# Patient Record
Sex: FEMALE
[De-identification: names, ages, dates, MRNs, and addresses within clinical notes are randomized per-mention and may not be internally consistent; named-entity substitution may affect disease eponyms.]

---

## 2021-02-02 PROBLEM — Z00.00 ENCOUNTER FOR PREVENTIVE HEALTH EXAMINATION: Status: ACTIVE | Noted: 2021-02-02

## 2021-02-09 ENCOUNTER — APPOINTMENT (OUTPATIENT)
Dept: ENDOCRINOLOGY | Facility: CLINIC | Age: 66
End: 2021-02-09
Payer: MEDICARE

## 2021-02-09 VITALS
SYSTOLIC BLOOD PRESSURE: 128 MMHG | HEART RATE: 62 BPM | OXYGEN SATURATION: 98 % | WEIGHT: 262 LBS | HEIGHT: 68 IN | DIASTOLIC BLOOD PRESSURE: 73 MMHG | RESPIRATION RATE: 15 BRPM | BODY MASS INDEX: 39.71 KG/M2

## 2021-02-09 DIAGNOSIS — K21.9 GASTRO-ESOPHAGEAL REFLUX DISEASE W/OUT ESOPHAGITIS: ICD-10-CM

## 2021-02-09 DIAGNOSIS — Z83.3 FAMILY HISTORY OF DIABETES MELLITUS: ICD-10-CM

## 2021-02-09 DIAGNOSIS — J45.909 UNSPECIFIED ASTHMA, UNCOMPLICATED: ICD-10-CM

## 2021-02-09 DIAGNOSIS — Z87.891 PERSONAL HISTORY OF NICOTINE DEPENDENCE: ICD-10-CM

## 2021-02-09 LAB
GLUCOSE BLDC GLUCOMTR-MCNC: 187
HBA1C MFR BLD HPLC: 9.2

## 2021-02-09 PROCEDURE — 82962 GLUCOSE BLOOD TEST: CPT

## 2021-02-09 PROCEDURE — 99072 ADDL SUPL MATRL&STAF TM PHE: CPT

## 2021-02-09 PROCEDURE — 99205 OFFICE O/P NEW HI 60 MIN: CPT | Mod: 25

## 2021-02-09 PROCEDURE — 83036 HEMOGLOBIN GLYCOSYLATED A1C: CPT | Mod: QW

## 2021-02-09 RX ORDER — BUDESONIDE AND FORMOTEROL FUMARATE DIHYDRATE 160; 4.5 UG/1; UG/1
160-4.5 AEROSOL RESPIRATORY (INHALATION)
Refills: 0 | Status: ACTIVE | COMMUNITY

## 2021-02-09 RX ORDER — DULAGLUTIDE 0.75 MG/.5ML
0.75 INJECTION, SOLUTION SUBCUTANEOUS
Qty: 1 | Refills: 0 | Status: COMPLETED | OUTPATIENT
Start: 2021-02-09 | End: 2021-03-11

## 2021-02-09 RX ORDER — MULTIVITAMIN
TABLET ORAL
Refills: 0 | Status: ACTIVE | COMMUNITY

## 2021-02-09 RX ORDER — CALCIUM CITRATE/VITAMIN D3 315MG-6.25
TABLET ORAL
Refills: 0 | Status: ACTIVE | COMMUNITY

## 2021-02-23 NOTE — DATA REVIEWED
[FreeTextEntry1] : Laboratories (December 1, 2020) reviewed and significant for: \par Platelet count 418 K/mcL (normal: 140-400), otherwise unremarkable complete blood count\par Unremarkable comprehensive metabolic panel\par  mg/dL\par HDL 41 mg/dL\par Total cholesterol 175 mg/dL\par Triglycerides 128 gm/dL

## 2021-02-23 NOTE — ASSESSMENT
[FreeTextEntry1] : Type 2 diabetes mellitus/elevated body mass index. Point-of-care HbA1c 9.2% and blood glucose 187 mg/dL today. No known history of micro- or macrovascular complications. We discussed the cardiovascular and microvascular complications of uncontrolled diabetes. We discussed the importance of diet and exercise and lifestyle modification for glycemic control and weight loss. We discussed pharmacologic options for glycemic control and weight loss. She is amenable to a trial of a GLP-1 receptor agonist. We discussed the risks and benefits of the GLP-1 receptor agonist class, including but not limited to nausea, pancreatitis, medullary thyroid cancer. We reviewed subcutaneous injection technique, storage, and sharps disposal. She successfully administered a dose of Trulicity in the office. We will plan to discontinue Farxiga once glycemic control improved due to her history of vulvovaginitis.\par Continue metformin  mg daily\par Continue Farxiga 5 mg daily\par Start Trulicity 0.75 mg weekly for four weeks (samples given), then 1.5 mg weekly if covered by insurance\par Check blood sugars twice daily, fasting and postprandial; glycemic goals reviewed\par She is not on a blood pressure regimen; blood pressure around goal\par She is on a statin for cholesterol; last lipid panel not at goal and recommended adjustment in atorvastatin to 40 mg daily\par Nephropathy screening: Due; address next visit\par Last ophthalmology appointment: October 2020; upcoming appointment in April 2021\par Last dental appointment: Full dentures\par She is up-to-date with influenza and pneumonia vaccines\par \par Return to see me in 3 months. Patient advised to call earlier with significant hypo- or hyperglycemia. \par \par CC:\par Dr. Marco A Lazaro, Fax [phone]

## 2021-02-23 NOTE — HISTORY OF PRESENT ILLNESS
[FreeTextEntry1] : Ms. Rubio is a 66 year-old woman with a history of type 2 diabetes mellitus presenting to [facility] care with me.\par \par Type 2 diabetes mellitus. Point-of-care HbA1c 9.2% and blood glucose 187 mg/dL today. No known history of micro- or macrovascular complications.\par She was diagnosed with diabetes at age 50. No hospitalizations for hypo- or hyperglycemia.\par She is currently taking metformin  mg daily, Farxiga 5 mg daily. She did not tolerate higher doses of metformin in the past. She was previously on glipizide with blood sugar variability. She has a history of Candidal vulvovaginitis on Farxiga, under control with fluconazole once weekly. \par She is checking blood sugars daily. Fasting blood sugars 90-150s mg/dL, mostly 120-130s mg/dL. No recent hypoglycemia.\par She is not on a blood pressure regimen\par She is on a statin for cholesterol\par Nephropathy screening: Due\par Last ophthalmology appointment: October 2020; upcoming appointment in April 2021\par Last dental appointment: Full dentures\par She is up-to-date with influenza and pneumonia vaccines\par 24 hour diet recall: breakfast, grilled cheese sandwich, tea with Splenda; lunch, none; dinner, chicken sandwich, salad; no juices/sodas/sweetened beverages\par Exercise: Limited\par \par She has had fatigue, polyuria/polydipsia, headaches. No chest pain, shortness of breath, polyuria/polydipsia, lower extremity numbness/tingling.

## 2021-05-11 ENCOUNTER — APPOINTMENT (OUTPATIENT)
Dept: ENDOCRINOLOGY | Facility: CLINIC | Age: 66
End: 2021-05-11
Payer: MEDICARE

## 2021-05-11 VITALS
BODY MASS INDEX: 39.99 KG/M2 | HEART RATE: 98 BPM | WEIGHT: 263 LBS | DIASTOLIC BLOOD PRESSURE: 79 MMHG | SYSTOLIC BLOOD PRESSURE: 124 MMHG

## 2021-05-11 LAB
GLUCOSE BLDC GLUCOMTR-MCNC: 134
HBA1C MFR BLD HPLC: 7.8

## 2021-05-11 PROCEDURE — 99072 ADDL SUPL MATRL&STAF TM PHE: CPT

## 2021-05-11 PROCEDURE — 83036 HEMOGLOBIN GLYCOSYLATED A1C: CPT | Mod: QW

## 2021-05-11 PROCEDURE — 82962 GLUCOSE BLOOD TEST: CPT

## 2021-05-11 PROCEDURE — 99214 OFFICE O/P EST MOD 30 MIN: CPT | Mod: 25

## 2021-05-11 RX ORDER — METFORMIN HYDROCHLORIDE 500 MG/1
500 TABLET, COATED ORAL
Refills: 0 | Status: DISCONTINUED | COMMUNITY
End: 2021-05-11

## 2021-05-11 RX ORDER — OMEPRAZOLE 40 MG/1
40 CAPSULE, DELAYED RELEASE ORAL
Refills: 0 | Status: DISCONTINUED | COMMUNITY
End: 2021-05-11

## 2021-05-11 RX ORDER — ATORVASTATIN CALCIUM 80 MG/1
80 TABLET, FILM COATED ORAL
Qty: 90 | Refills: 3 | Status: ACTIVE | COMMUNITY
Start: 1900-01-01 | End: 1900-01-01

## 2021-05-11 RX ORDER — BLOOD-GLUCOSE METER
KIT MISCELLANEOUS
Qty: 90 | Refills: 3 | Status: ACTIVE | COMMUNITY
Start: 2021-05-11 | End: 1900-01-01

## 2021-05-11 RX ORDER — LANCETS 33 GAUGE
EACH MISCELLANEOUS
Qty: 1 | Refills: 3 | Status: ACTIVE | COMMUNITY
Start: 2021-05-11 | End: 1900-01-01

## 2021-05-11 RX ORDER — MUPIROCIN 20 MG/G
2 OINTMENT TOPICAL
Qty: 15 | Refills: 0 | Status: ACTIVE | COMMUNITY
Start: 2020-12-12

## 2021-05-11 NOTE — DATA REVIEWED
[FreeTextEntry1] : Laboratories (April 30, 2021) reviewed and significant for: \par Unremarkable complete blood count\par Unremarkable comprehensive metabolic panel\par  mg/dL\par HDL 48 mg/dL\par Total cholesterol 171 mg/dL\par Triglycerides 84 gm/dL\par Urine microalbumin 19 mcg/mg creatinine\par \par Laboratories (December 1, 2020) reviewed and significant for: \par Platelet count 418 K/mcL (normal: 140-400), otherwise unremarkable complete blood count\par Unremarkable comprehensive metabolic panel\par  mg/dL\par HDL 41 mg/dL\par Total cholesterol 175 mg/dL\par Triglycerides 128 gm/dL

## 2021-05-11 NOTE — PHYSICAL EXAM
[Alert] : alert [Healthy Appearance] : healthy appearance [No Acute Distress] : no acute distress [Normal Sclera/Conjunctiva] : normal sclera/conjunctiva [Normal Hearing] : hearing was normal [No Neck Mass] : no neck mass was observed [No LAD] : no lymphadenopathy [Supple] : the neck was supple [Thyroid Not Enlarged] : the thyroid was not enlarged [No Respiratory Distress] : no respiratory distress [Clear to Auscultation] : lungs were clear to auscultation bilaterally [Normal S1, S2] : normal S1 and S2 [Normal Rate] : heart rate was normal [Regular Rhythm] : with a regular rhythm [No Stigmata of Cushings Syndrome] : no stigmata of Cushings Syndrome [Normal Gait] : normal gait [Acanthosis Nigricans] : acanthosis nigricans present [Normal Insight/Judgement] : insight and judgment were intact [Kyphosis] : no kyphosis present [de-identified] : no moon facies, no supraclavicular fat pads [de-identified] : Foot examination performed in February 2021

## 2021-05-11 NOTE — ASSESSMENT
[FreeTextEntry1] : Type 2 diabetes mellitus/elevated body mass index. Point-of-care HbA1c 7.8% and blood glucose 134 mg/dL today; HbA1c 9.2% in February 2021. No known history of micro- or macrovascular complications. I congratulated her on her improvement in glycemic control; her HbA1c today does not fully reflect the effect of the higher dose of Trulicity. We discussed the cardiovascular and microvascular complications of uncontrolled diabetes. We discussed the importance of diet and exercise and lifestyle modification for glycemic control and weight loss. We discussed pharmacologic options for glycemic control and weight loss. She is tolerating her current regimen and prefers to continue for now, with adjustment next visit as needed. We can plan to discontinue Farxiga once glycemic control improved due to her history of vulvovaginitis.\par Continue Farxiga 5 mg daily\par Continue Trulicity 1.5 mg weekly\par Check blood sugars daily, fasting or postprandial; glycemic goals reviewed\par She is not on a blood pressure regimen; blood pressure around goal\par She is on a statin for cholesterol; last lipid panel not at goal and recommended adjustment in atorvastatin to 80 mg daily\par Nephropathy screening: Urine microalbumin within range in April 2021\par Last ophthalmology appointment: April 2021\par Last dental appointment: Full dentures\par She is up-to-date with influenza, pneumonia, and COVID-19 (Moderna) vaccines\par \par Return to see me in 3 months. Patient advised to call earlier with significant hypo- or hyperglycemia. \par \par CC:\par Dr. Marco A Lazaro, Fax [phone]

## 2021-09-17 ENCOUNTER — NON-APPOINTMENT (OUTPATIENT)
Age: 66
End: 2021-09-17

## 2021-09-17 ENCOUNTER — APPOINTMENT (OUTPATIENT)
Dept: ENDOCRINOLOGY | Facility: CLINIC | Age: 66
End: 2021-09-17
Payer: MEDICARE

## 2021-09-17 ENCOUNTER — RESULT CHARGE (OUTPATIENT)
Age: 66
End: 2021-09-17

## 2021-09-17 VITALS
DIASTOLIC BLOOD PRESSURE: 74 MMHG | HEIGHT: 68 IN | BODY MASS INDEX: 38.95 KG/M2 | WEIGHT: 257 LBS | HEART RATE: 97 BPM | SYSTOLIC BLOOD PRESSURE: 112 MMHG

## 2021-09-17 LAB
GLUCOSE BLDC GLUCOMTR-MCNC: 108
HBA1C MFR BLD HPLC: 7.6

## 2021-09-17 PROCEDURE — 99214 OFFICE O/P EST MOD 30 MIN: CPT | Mod: 25

## 2021-09-17 PROCEDURE — 82947 ASSAY GLUCOSE BLOOD QUANT: CPT | Mod: QW

## 2021-09-17 PROCEDURE — 83036 HEMOGLOBIN GLYCOSYLATED A1C: CPT | Mod: QW

## 2021-09-17 RX ORDER — DAPAGLIFLOZIN 10 MG/1
10 TABLET, FILM COATED ORAL DAILY
Qty: 90 | Refills: 3 | Status: ACTIVE | COMMUNITY
Start: 1900-01-01 | End: 1900-01-01

## 2021-09-17 RX ORDER — FLUCONAZOLE 150 MG/1
150 TABLET ORAL
Refills: 0 | Status: DISCONTINUED | COMMUNITY
End: 2021-09-17

## 2021-09-20 LAB
25(OH)D3 SERPL-MCNC: 41.5 NG/ML
ALBUMIN SERPL ELPH-MCNC: 4.1 G/DL
ALP BLD-CCNC: 145 U/L
ALT SERPL-CCNC: 19 U/L
ANION GAP SERPL CALC-SCNC: 15 MMOL/L
AST SERPL-CCNC: 18 U/L
BASOPHILS # BLD AUTO: 0.06 K/UL
BASOPHILS NFR BLD AUTO: 0.6 %
BILIRUB SERPL-MCNC: 0.7 MG/DL
BUN SERPL-MCNC: 7 MG/DL
CALCIUM SERPL-MCNC: 9.7 MG/DL
CHLORIDE SERPL-SCNC: 104 MMOL/L
CHOLEST SERPL-MCNC: 131 MG/DL
CO2 SERPL-SCNC: 22 MMOL/L
CREAT SERPL-MCNC: 0.75 MG/DL
CREAT SPEC-SCNC: 108 MG/DL
EOSINOPHIL # BLD AUTO: 0.46 K/UL
EOSINOPHIL NFR BLD AUTO: 4.5 %
GLUCOSE SERPL-MCNC: 101 MG/DL
HCT VFR BLD CALC: 41.6 %
HDLC SERPL-MCNC: 41 MG/DL
HGB BLD-MCNC: 12.9 G/DL
IMM GRANULOCYTES NFR BLD AUTO: 0.4 %
LDLC SERPL CALC-MCNC: 73 MG/DL
LYMPHOCYTES # BLD AUTO: 2.68 K/UL
LYMPHOCYTES NFR BLD AUTO: 25.9 %
MAN DIFF?: NORMAL
MCHC RBC-ENTMCNC: 27.4 PG
MCHC RBC-ENTMCNC: 31 GM/DL
MCV RBC AUTO: 88.3 FL
MICROALBUMIN 24H UR DL<=1MG/L-MCNC: <1.2 MG/DL
MICROALBUMIN/CREAT 24H UR-RTO: NORMAL MG/G
MONOCYTES # BLD AUTO: 0.85 K/UL
MONOCYTES NFR BLD AUTO: 8.2 %
NEUTROPHILS # BLD AUTO: 6.24 K/UL
NEUTROPHILS NFR BLD AUTO: 60.4 %
NONHDLC SERPL-MCNC: 90 MG/DL
PLATELET # BLD AUTO: 360 K/UL
POTASSIUM SERPL-SCNC: 4.3 MMOL/L
PROT SERPL-MCNC: 7.4 G/DL
RBC # BLD: 4.71 M/UL
RBC # FLD: 15.6 %
SODIUM SERPL-SCNC: 141 MMOL/L
TRIGL SERPL-MCNC: 81 MG/DL
TSH SERPL-ACNC: 1.19 UIU/ML
VIT B12 SERPL-MCNC: 812 PG/ML
WBC # FLD AUTO: 10.33 K/UL

## 2021-09-20 NOTE — ASSESSMENT
[FreeTextEntry1] : Type 2 diabetes mellitus/elevated body mass index. Point-of-care HbA1c 7.6% and blood glucose 108 mg/dL today; HbA1c 7.8% in May 2021, 9.2% in February 2021. No known history of micro- or macrovascular complications. I congratulated her on her improvement in glycemic control and weight loss. We discussed the cardiovascular and microvascular complications of uncontrolled diabetes. We discussed the importance of diet and exercise and lifestyle modification for glycemic control and weight loss. We discussed pharmacologic options for glycemic control and weight loss. She prefers an increase in Farxiga as opposed to Trulicity at this time. \par Adjust Farxiga to 10 mg daily\par Continue Trulicity 1.5 mg weekly\par Check blood sugars daily, fasting or postprandial; glycemic goals reviewed\par She is not on a blood pressure regimen; blood pressure around goal\par She is on a statin for cholesterol; repeat lipid panel\par Nephropathy screening: Urine microalbumin within range in April 2021\par Last ophthalmology appointment: July 2021; every 3 months due to dry eyes\par Last dental appointment: Full dentures\par She is up-to-date with pneumonia and COVID-19 (Moderna) vaccines\par \par Shortness of breath/asthma. I referred her to pulmonology providers within the [facility] system system.\par \par Return to see me in 3 months. Patient advised to call earlier with significant hypo- or hyperglycemia. \par \par CC:\par Dr. Marco A Lazaro, Fax [phone]

## 2021-09-20 NOTE — HISTORY OF PRESENT ILLNESS
[FreeTextEntry1] : Ms. Rubio is a 66 year-old woman presenting for follow-up of type 2 diabetes mellitus. I saw her for an initial visit in February 2021 and last in May 2021.\par \par Type 2 diabetes mellitus. Point-of-care HbA1c 7.6% and blood glucose 108 mg/dL today; HbA1c 7.8% in May 2021, 9.2% in February 2021. No known history of micro- or macrovascular complications.\par She was diagnosed with diabetes at age 50. No hospitalizations for hypo- or hyperglycemia.\par At her initial visit she was taking metformin  mg daily, Farxiga 5 mg daily. She did not tolerate higher doses of metformin in the past. She was previously on glipizide with blood sugar variability. She has a history of Candidal vulvovaginitis on Farxiga, under control with fluconazole once weekly. \par In February 2021 we continued metformin  mg daily, continued Farxiga 5 mg daily, and started Trulicity up to 1.5 mg weekly. She stopped metformin subsequent to our visit in February due to relative hypoglycemia and gastrointestinal side effects. \par She is currently taking Farxiga 5 mg daily and Trulicity 1.5 mg weekly. \par She is checking blood sugars daily as below\par She is not on a blood pressure regimen\par She is on a statin for cholesterol\par Nephropathy screening: Urine microalbumin within range in April 2021\par Last ophthalmology appointment: July 2021\par Last dental appointment: Full dentures\par She is up-to-date with pneumonia and COVID-19 (Moderna) vaccines\par \par Interim History \par She is currently taking Farxiga 5 mg daily and Trulicity 1.5 mg weekly. In May 2021 we adjusted atorvastatin to 80 mg daily. \par Fasting blood sugars 90-110s mg/dL. Postprandial blood sugars up to 198 mg/dL if measured.\par She has been having smaller portion sizes.\par She was on rifampin for worsening hydradenitis. \par She has had some shortness of breath she attributes to asthma. Weight is down 6 pounds since last visit. No chest pain or lower extremity numbness/tingling. \par Medical and surgical history, medications, allergies, social and family history reviewed and updated as needed.

## 2021-09-20 NOTE — ADDENDUM
[FreeTextEntry1] : Recent laboratory results as below; discussed with Ms. Ramiro. Alkaline phosphatase elevated of unclear etiology with normal AST/ALT; recommended follow-up with primary care. Lipid panel around goal. Urine microalbumin within range. Other test results within range. 9/20/21

## 2021-09-20 NOTE — PHYSICAL EXAM
[Alert] : alert [Healthy Appearance] : healthy appearance [No Acute Distress] : no acute distress [Normal Sclera/Conjunctiva] : normal sclera/conjunctiva [Normal Hearing] : hearing was normal [No Respiratory Distress] : no respiratory distress [No Stigmata of Cushings Syndrome] : no stigmata of Cushings Syndrome [Normal Gait] : normal gait [Acanthosis Nigricans] : acanthosis nigricans present [Normal Insight/Judgement] : insight and judgment were intact [Kyphosis] : no kyphosis present [de-identified] : no moon facies, no supraclavicular fat pads [de-identified] : Foot examination performed in February 2021

## 2021-12-17 ENCOUNTER — APPOINTMENT (OUTPATIENT)
Dept: ENDOCRINOLOGY | Facility: CLINIC | Age: 66
End: 2021-12-17
Payer: MEDICARE

## 2021-12-17 VITALS
SYSTOLIC BLOOD PRESSURE: 108 MMHG | WEIGHT: 252 LBS | BODY MASS INDEX: 38.32 KG/M2 | DIASTOLIC BLOOD PRESSURE: 70 MMHG | HEART RATE: 97 BPM

## 2021-12-17 DIAGNOSIS — E78.5 TYPE 2 DIABETES MELLITUS WITH OTHER SPECIFIED COMPLICATION: ICD-10-CM

## 2021-12-17 DIAGNOSIS — E11.65 TYPE 2 DIABETES MELLITUS WITH HYPERGLYCEMIA: ICD-10-CM

## 2021-12-17 DIAGNOSIS — E11.69 TYPE 2 DIABETES MELLITUS WITH OTHER SPECIFIED COMPLICATION: ICD-10-CM

## 2021-12-17 LAB
GLUCOSE BLDC GLUCOMTR-MCNC: 95
HBA1C MFR BLD HPLC: 7

## 2021-12-17 PROCEDURE — 83036 HEMOGLOBIN GLYCOSYLATED A1C: CPT | Mod: QW

## 2021-12-17 PROCEDURE — 82962 GLUCOSE BLOOD TEST: CPT

## 2021-12-17 PROCEDURE — 99214 OFFICE O/P EST MOD 30 MIN: CPT | Mod: 25

## 2021-12-17 NOTE — ASSESSMENT
[FreeTextEntry1] : Type 2 diabetes mellitus/elevated body mass index. Point-of-care HbA1c 7.0% and blood glucose 95 mg/dL today; HbA1c 7.6% in September 2021, 7.8% in May 2021, 9.2% in February 2021. No known history of micro- or macrovascular complications. I congratulated her on her improvement in glycemic control and weight loss. We discussed the cardiovascular and microvascular complications of uncontrolled diabetes. We discussed the importance of diet and exercise and lifestyle modification for glycemic control and weight loss. We discussed pharmacologic options for glycemic control and weight loss. She prefers to focus on lifestyle modification at this time; we can consider an adjustment in Trulicity next visit as needed.\par Continue Farxiga 10 mg daily\par Continue Trulicity 1.5 mg weekly\par Check blood sugars daily, fasting or postprandial; glycemic goals reviewed\par She is not on a blood pressure regimen; blood pressure around goal\par She is on a statin for cholesterol; last lipid panel around goal\par Nephropathy screening: Urine microalbumin within range in April 2021\par Last ophthalmology appointment: October 2021; every 3 months due to dry eyes\par Last dental appointment: Full dentures\par She is up-to-date with influenza, pneumonia, and COVID-19 (Moderna) vaccines\par \par Return to see me in 3 months. Patient advised to call earlier with significant hypo- or hyperglycemia. \par \par CC:\par Dr. Marco A Lazaro, Fax [phone]

## 2021-12-17 NOTE — PHYSICAL EXAM
[Alert] : alert [Healthy Appearance] : healthy appearance [No Acute Distress] : no acute distress [Normal Sclera/Conjunctiva] : normal sclera/conjunctiva [Normal Hearing] : hearing was normal [No Respiratory Distress] : no respiratory distress [No Stigmata of Cushings Syndrome] : no stigmata of Cushings Syndrome [Normal Gait] : normal gait [Acanthosis Nigricans] : acanthosis nigricans present [Normal Insight/Judgement] : insight and judgment were intact [Kyphosis] : no kyphosis present [de-identified] : no moon facies, no supraclavicular fat pads [de-identified] : Foot examination performed in February 2021

## 2021-12-17 NOTE — HISTORY OF PRESENT ILLNESS
[FreeTextEntry1] : Ms. Rubio is a 66 year-old woman presenting for follow-up of type 2 diabetes mellitus. I saw her for an initial visit in February 2021 and last in September 2021.\par \par Type 2 diabetes mellitus. Point-of-care HbA1c 7.0% and blood glucose 95 mg/dL today; HbA1c 7.6% in September 2021, 7.8% in May 2021, 9.2% in February 2021. No known history of micro- or macrovascular complications.\par She was diagnosed with diabetes at age 50. No hospitalizations for hypo- or hyperglycemia.\par At her initial visit she was taking metformin  mg daily, Farxiga 5 mg daily. She did not tolerate higher doses of metformin in the past. She was previously on glipizide with blood sugar variability. She has a history of Candidal vulvovaginitis on Farxiga, under control with fluconazole once weekly. \par In February 2021 we continued metformin  mg daily, continued Farxiga 5 mg daily, and started Trulicity up to 1.5 mg weekly. She stopped metformin subsequent to our visit in February due to relative hypoglycemia and gastrointestinal side effects. \par In September 2021 we adjusted Farxiga to 10 mg daily and continued Trulicity 1.5 mg weekly. \par She is checking blood sugars daily as below\par She is not on a blood pressure regimen\par She is on a statin for cholesterol\par Nephropathy screening: Urine microalbumin within range in April 2021\par Last ophthalmology appointment: October 2021; every 3 months due to dry eyes\par Last dental appointment: Full dentures\par She is up-to-date with influenza, pneumonia, and COVID-19 (Moderna) vaccines\par \par Interim History \par In September 2021 we adjusted Farxiga to 10 mg daily and continued Trulicity 1.5 mg weekly. \par Laboratory results from last visit as below. Alkaline phosphatase elevated of unclear etiology with normal AST/ALT; recommended follow-up with primary care. Lipid panel around goal. Urine microalbumin within range. Other test results within range. \par Fasting blood sugars 90-110s mg/dL. \par She has been having smaller portion sizes.\par She had an episode of gastritis, since improved.\par Weight is down 5 pounds since last visit; weight is down 10 pounds from 262 pounds since February 2021. No chest pain, shortness of breath, polyuria/polydipsia, lower extremity numbness/tingling. \par Medical and surgical history, medications, allergies, social and family history reviewed and updated as needed.

## 2022-01-03 RX ORDER — DULAGLUTIDE 1.5 MG/.5ML
1.5 INJECTION, SOLUTION SUBCUTANEOUS
Qty: 3 | Refills: 2 | Status: ACTIVE | COMMUNITY
Start: 2021-02-09 | End: 1900-01-01

## 2022-03-18 ENCOUNTER — APPOINTMENT (OUTPATIENT)
Dept: ENDOCRINOLOGY | Facility: CLINIC | Age: 67
End: 2022-03-18